# Patient Record
Sex: MALE | Race: WHITE | URBAN - METROPOLITAN AREA
[De-identification: names, ages, dates, MRNs, and addresses within clinical notes are randomized per-mention and may not be internally consistent; named-entity substitution may affect disease eponyms.]

---

## 2019-04-21 ENCOUNTER — HOSPITAL ENCOUNTER (EMERGENCY)
Facility: CLINIC | Age: 19
Discharge: HOME | End: 2019-04-21
Attending: NURSE PRACTITIONER | Admitting: NURSE PRACTITIONER
Payer: COMMERCIAL

## 2019-04-21 VITALS
HEIGHT: 70 IN | BODY MASS INDEX: 22.19 KG/M2 | TEMPERATURE: 100.1 F | OXYGEN SATURATION: 98 % | WEIGHT: 155 LBS | RESPIRATION RATE: 16 BRPM | DIASTOLIC BLOOD PRESSURE: 76 MMHG | SYSTOLIC BLOOD PRESSURE: 124 MMHG

## 2019-04-21 DIAGNOSIS — J06.9 VIRAL URI WITH COUGH: ICD-10-CM

## 2019-04-21 LAB
FLUAV AG UPPER RESP QL IA.RAPID: NEGATIVE
FLUBV AG UPPER RESP QL IA.RAPID: NEGATIVE
INTERNAL QC OK POCT: YES
INTERNAL QC OK POCT: YES
S PYO AG THROAT QL IA.RAPID: NEGATIVE

## 2019-04-21 PROCEDURE — 87880 STREP A ASSAY W/OPTIC: CPT | Performed by: NURSE PRACTITIONER

## 2019-04-21 PROCEDURE — 87081 CULTURE SCREEN ONLY: CPT | Performed by: NURSE PRACTITIONER

## 2019-04-21 PROCEDURE — 87804 INFLUENZA ASSAY W/OPTIC: CPT | Performed by: NURSE PRACTITIONER

## 2019-04-21 PROCEDURE — G0463 HOSPITAL OUTPT CLINIC VISIT: HCPCS

## 2019-04-21 PROCEDURE — 99213 OFFICE O/P EST LOW 20 MIN: CPT | Mod: Z6 | Performed by: NURSE PRACTITIONER

## 2019-04-21 ASSESSMENT — ENCOUNTER SYMPTOMS
COUGH: 1
CHILLS: 1
MYALGIAS: 1
VOMITING: 0
HEADACHES: 1
FEVER: 1
FATIGUE: 1
SORE THROAT: 1

## 2019-04-21 ASSESSMENT — MIFFLIN-ST. JEOR: SCORE: 1719.33

## 2019-04-21 NOTE — ED PROVIDER NOTES
"  History     Chief Complaint   Patient presents with     Pharyngitis     HPI  Gray Santamaria is a 19 year old male who presents to urgent care for evaluation of cough, congestion, and sore throat.  Symptoms started 4 days ago.  Accompanied by fevers.  No chest pain or shortness of breath.  Exposed to his brother who has strep throat and a friend who has influenza.    Allergies:  No Known Allergies    Problem List:    There are no active problems to display for this patient.       Past Medical History:    No past medical history on file.    Past Surgical History:    No past surgical history on file.    Family History:    No family history on file.    Social History:  Marital Status:  Single [1]  Social History     Tobacco Use     Smoking status: Not on file   Substance Use Topics     Alcohol use: Not on file     Drug use: Not on file        Medications:      No current outpatient medications on file.      Review of Systems   Constitutional: Positive for chills, fatigue and fever.   HENT: Positive for congestion and sore throat. Negative for ear pain.    Respiratory: Positive for cough.    Cardiovascular: Negative for chest pain.   Gastrointestinal: Negative for vomiting.   Musculoskeletal: Positive for myalgias.   Neurological: Positive for headaches.       Physical Exam   BP: 124/76  Heart Rate: 87  Temp: 100.1  F (37.8  C)  Resp: 16  Height: 177.8 cm (5' 10\")  Weight: 70.3 kg (155 lb)  SpO2: 98 %      Physical Exam  GENERAL APPEARANCE: healthy, alert and no acute distress  EYES: conjunctiva clear  HENT: ear canals and TM's normal.  Nose normal.  Posterior oropharynx erythema without exudate.  Uvula is midline.  No unilateral peritonsillar swelling. No trismus  NECK: supple, nontender, no lymphadenopathy  RESP: lungs clear to auscultation - no rales, rhonchi or wheezes  CV: regular rates and rhythm, normal S1 S2, no murmur noted    ED Course        Procedures               Results for orders placed or performed " during the hospital encounter of 04/21/19 (from the past 24 hour(s))   Rapid strep group A screen POCT   Result Value Ref Range    Rapid Strep A Screen NEGATIVE neg    Internal QC OK Yes    Influenza A/B antigen POCT   Result Value Ref Range    Influenza A Negative neg    Influenza B Negative neg    Internal QC OK Yes        Medications - No data to display    Assessments & Plan (with Medical Decision Making)   RST negative with culture pending.  No evidence of peritonsillar cellulitis or abscess.  Influenza is negative. Likely some other viral URI.  He was instructed to continue OTC symptomatic treatment.  Follow up with PCP if no improvement in 5-7 days.  Worrisome reasons to seek care sooner discussed.      I have reviewed the nursing notes.    I have reviewed the findings, diagnosis, plan and need for follow up with the patient.         Medication List      There are no discharge medications for this visit.         Final diagnoses:   Viral URI with cough       4/21/2019   [facility] EMERGENCY DEPARTMENT     Amber Trujillo APRN CNP  04/21/19 [id number]

## 2019-04-21 NOTE — ED TRIAGE NOTES
Patient presents today with cough . Symptoms started two weeks ago . Arrived to urgent care ambulatory .

## 2019-04-21 NOTE — ED AVS SNAPSHOT
[facility] Emergency Department  [address] [place] [address]  Phone:  [phone]  Fax:  [phone]                                    Gray Santamaria   MRN: [id number]    Department:  [facility] Emergency Department   Date of Visit:  4/21/2019           After Visit Summary Signature Page    I have received my discharge instructions, and my questions have been answered. I have discussed any challenges I see with this plan with the nurse or doctor.    ..........................................................................................................................................  Patient/Patient Representative Signature      ..........................................................................................................................................  Patient Representative Print Name and Relationship to Patient    ..................................................               ................................................  Date                                   Time    ..........................................................................................................................................  Reviewed by Signature/Title    ...................................................              ..............................................  Date                                               Time          22EPIC Rev 08/18

## 2019-04-22 NOTE — RESULT ENCOUNTER NOTE
Preliminary Beta strep group A r/o culture is PENDING and/or NEGATIVE at this time.   No changes in treatment per [place] Strep protocol.

## 2019-04-24 NOTE — RESULT ENCOUNTER NOTE
Preliminary Beta strep group A r/o culture is PENDING and/or NEGATIVE at this time.   No changes in treatment per [place] Strep protocol.

## 2019-04-25 LAB
BACTERIA SPEC CULT: NORMAL
SPECIMEN SOURCE: NORMAL

## 2019-04-25 NOTE — RESULT ENCOUNTER NOTE
Final Beta strep group A r/o culture is NEGATIVE for Group A streptococcus.    No treatment or change in treatment per [place] Strep protocol.